# Patient Record
Sex: FEMALE | NOT HISPANIC OR LATINO | URBAN - METROPOLITAN AREA
[De-identification: names, ages, dates, MRNs, and addresses within clinical notes are randomized per-mention and may not be internally consistent; named-entity substitution may affect disease eponyms.]

---

## 2021-04-28 ENCOUNTER — APPOINTMENT (OUTPATIENT)
Age: 51
Setting detail: DERMATOLOGY
End: 2021-04-28

## 2021-04-28 DIAGNOSIS — L85.3 XEROSIS CUTIS: ICD-10-CM

## 2021-04-28 DIAGNOSIS — L30.9 DERMATITIS, UNSPECIFIED: ICD-10-CM

## 2021-04-28 PROCEDURE — OTHER MIPS QUALITY: OTHER

## 2021-04-28 PROCEDURE — OTHER COUNSELING: OTHER

## 2021-04-28 PROCEDURE — 11105 PUNCH BX SKIN EA SEP/ADDL: CPT

## 2021-04-28 PROCEDURE — 11104 PUNCH BX SKIN SINGLE LESION: CPT

## 2021-04-28 PROCEDURE — 99202 OFFICE O/P NEW SF 15 MIN: CPT | Mod: 25

## 2021-04-28 PROCEDURE — OTHER BIOPSY BY PUNCH METHOD: OTHER

## 2021-04-28 ASSESSMENT — LOCATION DETAILED DESCRIPTION DERM
LOCATION DETAILED: LEFT MEDIAL UPPER BACK
LOCATION DETAILED: RIGHT SUPERIOR UPPER BACK
LOCATION DETAILED: RIGHT LATERAL ABDOMEN
LOCATION DETAILED: RIGHT LATERAL SUPERIOR CHEST
LOCATION DETAILED: RIGHT MID-UPPER BACK
LOCATION DETAILED: LEFT RIB CAGE
LOCATION DETAILED: LEFT MEDIAL BREAST 6-7:00 REGION
LOCATION DETAILED: LEFT LATERAL SUPERIOR CHEST
LOCATION DETAILED: RIGHT LATERAL BREAST 11-12:00 REGION
LOCATION DETAILED: LEFT SUPERIOR UPPER BACK
LOCATION DETAILED: RIGHT INFERIOR LATERAL MIDBACK
LOCATION DETAILED: LEFT MEDIAL BREAST 10-11:00 REGION
LOCATION DETAILED: LEFT MID-UPPER BACK
LOCATION DETAILED: LEFT LATERAL ABDOMEN
LOCATION DETAILED: RIGHT MEDIAL BREAST 5-6:00 REGION
LOCATION DETAILED: RIGHT RIB CAGE
LOCATION DETAILED: LEFT INFERIOR LATERAL MIDBACK

## 2021-04-28 ASSESSMENT — LOCATION ZONE DERM: LOCATION ZONE: TRUNK

## 2021-04-28 ASSESSMENT — LOCATION SIMPLE DESCRIPTION DERM
LOCATION SIMPLE: CHEST
LOCATION SIMPLE: RIGHT UPPER BACK
LOCATION SIMPLE: RIGHT BREAST
LOCATION SIMPLE: LEFT LOWER BACK
LOCATION SIMPLE: LEFT UPPER BACK
LOCATION SIMPLE: ABDOMEN
LOCATION SIMPLE: RIGHT LOWER BACK
LOCATION SIMPLE: LEFT BREAST

## 2021-04-28 NOTE — PROCEDURE: BIOPSY BY PUNCH METHOD
Validate Triangulation: No
Dressing: bandage
Post-Care Instructions: I reviewed with the patient in detail post-care instructions. Patient is to keep the biopsy site dry overnight, and then apply bacitracin twice daily until healed. Patient may apply hydrogen peroxide soaks to remove any crusting.
Anesthesia Volume In Cc (Will Not Render If 0): 0.5
Anesthesia Type: 1% lidocaine with epinephrine
Consent: Written consent was obtained and risks were reviewed including but not limited to scarring, infection, bleeding, scabbing, incomplete removal, nerve damage and allergy to anesthesia.
Billing Type: Third-Party Bill
Was A Bandage Applied: Yes
Size Of Lesion In Cm (Optional): 0
Suture Removal: 14 days
Notification Instructions: Patient will be notified of biopsy results. However, patient instructed to call the office if not contacted within 2 weeks.
Biopsy Type: H and E
Epidermal Sutures: 4-0 Nylon
Hemostasis: None
Home Suture Removal Text: Patient was provided a home suture removal kit and will remove their sutures at home.  If they have any questions or difficulties they will call the office.
Detail Level: Detailed
Information: Selecting Yes will display possible errors in your note based on the variables you have selected. This validation is only offered as a suggestion for you. PLEASE NOTE THAT THE VALIDATION TEXT WILL BE REMOVED WHEN YOU FINALIZE YOUR NOTE. IF YOU WANT TO FAX A PRELIMINARY NOTE YOU WILL NEED TO TOGGLE THIS TO 'NO' IF YOU DO NOT WANT IT IN YOUR FAXED NOTE.
Punch Size In Mm: 4
Wound Care: Petrolatum
Biopsy Type: DIF

## 2021-05-12 ENCOUNTER — APPOINTMENT (OUTPATIENT)
Age: 51
Setting detail: DERMATOLOGY
End: 2021-05-12

## 2021-05-12 DIAGNOSIS — L30.0 NUMMULAR DERMATITIS: ICD-10-CM

## 2021-05-12 DIAGNOSIS — L21.8 OTHER SEBORRHEIC DERMATITIS: ICD-10-CM

## 2021-05-12 PROCEDURE — 99213 OFFICE O/P EST LOW 20 MIN: CPT

## 2021-05-12 PROCEDURE — OTHER COUNSELING: OTHER

## 2021-05-12 PROCEDURE — OTHER PRESCRIPTION: OTHER

## 2021-05-12 PROCEDURE — OTHER PATHOLOGY DISCUSSION: OTHER

## 2021-05-12 PROCEDURE — OTHER PRESCRIPTION MEDICATION MANAGEMENT: OTHER

## 2021-05-12 RX ORDER — TRIAMCINOLONE ACETONIDE 1 MG/G
OINTMENT TOPICAL
Qty: 1 | Refills: 1 | Status: ERX | COMMUNITY
Start: 2021-05-12

## 2021-05-12 ASSESSMENT — LOCATION DETAILED DESCRIPTION DERM
LOCATION DETAILED: LEFT THENAR EMINENCE
LOCATION DETAILED: RIGHT RADIAL PALM
LOCATION DETAILED: POSTERIOR MID-PARIETAL SCALP
LOCATION DETAILED: UPPER STERNUM

## 2021-05-12 ASSESSMENT — LOCATION ZONE DERM
LOCATION ZONE: TRUNK
LOCATION ZONE: HAND
LOCATION ZONE: SCALP

## 2021-05-12 ASSESSMENT — LOCATION SIMPLE DESCRIPTION DERM
LOCATION SIMPLE: CHEST
LOCATION SIMPLE: RIGHT HAND
LOCATION SIMPLE: POSTERIOR SCALP
LOCATION SIMPLE: LEFT HAND

## 2021-05-12 NOTE — PROCEDURE: PRESCRIPTION MEDICATION MANAGEMENT
Modify Regimen: Increased using the Ketoconazole shampoo to daily until under control
Detail Level: Zone
Render In Strict Bullet Format?: No
Initiate Treatment: Instructed to avoid excessive hand